# Patient Record
Sex: MALE | Employment: FULL TIME | URBAN - METROPOLITAN AREA
[De-identification: names, ages, dates, MRNs, and addresses within clinical notes are randomized per-mention and may not be internally consistent; named-entity substitution may affect disease eponyms.]

---

## 2018-12-24 ENCOUNTER — HOSPITAL ENCOUNTER (EMERGENCY)
Facility: CLINIC | Age: 43
Discharge: HOME | End: 2018-12-25
Attending: EMERGENCY MEDICINE | Admitting: EMERGENCY MEDICINE
Payer: COMMERCIAL

## 2018-12-24 DIAGNOSIS — R31.0 GROSS HEMATURIA: ICD-10-CM

## 2018-12-24 DIAGNOSIS — N32.89 BLADDER MASS: ICD-10-CM

## 2018-12-24 DIAGNOSIS — N23 RENAL COLIC: ICD-10-CM

## 2018-12-24 LAB
ALBUMIN SERPL-MCNC: 4 G/DL (ref 3.4–5)
ALBUMIN UR-MCNC: 100 MG/DL
ALP SERPL-CCNC: 73 U/L (ref 40–150)
ALT SERPL W P-5'-P-CCNC: 37 U/L (ref 0–70)
ANION GAP SERPL CALCULATED.3IONS-SCNC: 2 MMOL/L (ref 3–14)
APPEARANCE UR: ABNORMAL
AST SERPL W P-5'-P-CCNC: 21 U/L (ref 0–45)
BASOPHILS # BLD AUTO: 0.1 10E9/L (ref 0–0.2)
BASOPHILS NFR BLD AUTO: 0.7 %
BILIRUB SERPL-MCNC: 0.5 MG/DL (ref 0.2–1.3)
BILIRUB UR QL STRIP: NEGATIVE
BUN SERPL-MCNC: 10 MG/DL (ref 7–30)
CALCIUM SERPL-MCNC: 9.4 MG/DL (ref 8.5–10.1)
CHLORIDE SERPL-SCNC: 108 MMOL/L (ref 94–109)
CO2 SERPL-SCNC: 30 MMOL/L (ref 20–32)
COLOR UR AUTO: ABNORMAL
CREAT SERPL-MCNC: 1.08 MG/DL (ref 0.66–1.25)
DIFFERENTIAL METHOD BLD: ABNORMAL
EOSINOPHIL # BLD AUTO: 0.3 10E9/L (ref 0–0.7)
EOSINOPHIL NFR BLD AUTO: 3.4 %
ERYTHROCYTE [DISTWIDTH] IN BLOOD BY AUTOMATED COUNT: 13.2 % (ref 10–15)
GFR SERPL CREATININE-BSD FRML MDRD: 83 ML/MIN/{1.73_M2}
GLUCOSE SERPL-MCNC: 101 MG/DL (ref 70–99)
GLUCOSE UR STRIP-MCNC: NEGATIVE MG/DL
HCT VFR BLD AUTO: 42.2 % (ref 40–53)
HGB BLD-MCNC: 14.2 G/DL (ref 13.3–17.7)
HGB UR QL STRIP: ABNORMAL
IMM GRANULOCYTES # BLD: 0 10E9/L (ref 0–0.4)
IMM GRANULOCYTES NFR BLD: 0.2 %
KETONES UR STRIP-MCNC: NEGATIVE MG/DL
LEUKOCYTE ESTERASE UR QL STRIP: NEGATIVE
LIPASE SERPL-CCNC: 112 U/L (ref 73–393)
LYMPHOCYTES # BLD AUTO: 2.8 10E9/L (ref 0.8–5.3)
LYMPHOCYTES NFR BLD AUTO: 30.3 %
MCH RBC QN AUTO: 29.5 PG (ref 26.5–33)
MCHC RBC AUTO-ENTMCNC: 33.6 G/DL (ref 31.5–36.5)
MCV RBC AUTO: 88 FL (ref 78–100)
MONOCYTES # BLD AUTO: 0.7 10E9/L (ref 0–1.3)
MONOCYTES NFR BLD AUTO: 7.9 %
NEUTROPHILS # BLD AUTO: 5.4 10E9/L (ref 1.6–8.3)
NEUTROPHILS NFR BLD AUTO: 57.5 %
NITRATE UR QL: NEGATIVE
NRBC # BLD AUTO: 0 10*3/UL
NRBC BLD AUTO-RTO: 0 /100
PH UR STRIP: 7 PH (ref 5–7)
PLATELET # BLD AUTO: 148 10E9/L (ref 150–450)
POTASSIUM SERPL-SCNC: 3.7 MMOL/L (ref 3.4–5.3)
PROT SERPL-MCNC: 7.4 G/DL (ref 6.8–8.8)
RBC # BLD AUTO: 4.81 10E12/L (ref 4.4–5.9)
RBC #/AREA URNS AUTO: >182 /HPF (ref 0–2)
SODIUM SERPL-SCNC: 140 MMOL/L (ref 133–144)
SOURCE: ABNORMAL
SP GR UR STRIP: 1.01 (ref 1–1.03)
UROBILINOGEN UR STRIP-MCNC: NEGATIVE MG/DL (ref 0–2)
WBC # BLD AUTO: 9.3 10E9/L (ref 4–11)
WBC #/AREA URNS AUTO: 0 /HPF (ref 0–5)

## 2018-12-24 PROCEDURE — 99285 EMERGENCY DEPT VISIT HI MDM: CPT | Mod: 25

## 2018-12-24 PROCEDURE — 96374 THER/PROPH/DIAG INJ IV PUSH: CPT

## 2018-12-24 PROCEDURE — 76705 ECHO EXAM OF ABDOMEN: CPT

## 2018-12-24 PROCEDURE — 83690 ASSAY OF LIPASE: CPT | Performed by: EMERGENCY MEDICINE

## 2018-12-24 PROCEDURE — 81001 URINALYSIS AUTO W/SCOPE: CPT | Performed by: EMERGENCY MEDICINE

## 2018-12-24 PROCEDURE — 25000128 H RX IP 250 OP 636: Performed by: EMERGENCY MEDICINE

## 2018-12-24 PROCEDURE — 80053 COMPREHEN METABOLIC PANEL: CPT | Performed by: EMERGENCY MEDICINE

## 2018-12-24 PROCEDURE — 96361 HYDRATE IV INFUSION ADD-ON: CPT

## 2018-12-24 PROCEDURE — 85025 COMPLETE CBC W/AUTO DIFF WBC: CPT | Performed by: EMERGENCY MEDICINE

## 2018-12-24 RX ORDER — SODIUM CHLORIDE 9 MG/ML
1000 INJECTION, SOLUTION INTRAVENOUS CONTINUOUS
Status: DISCONTINUED | OUTPATIENT
Start: 2018-12-24 | End: 2018-12-25 | Stop reason: HOSPADM

## 2018-12-24 RX ORDER — KETOROLAC TROMETHAMINE 15 MG/ML
10 INJECTION, SOLUTION INTRAMUSCULAR; INTRAVENOUS ONCE
Status: COMPLETED | OUTPATIENT
Start: 2018-12-24 | End: 2018-12-24

## 2018-12-24 RX ADMIN — SODIUM CHLORIDE 1000 ML: 9 INJECTION, SOLUTION INTRAVENOUS at 22:55

## 2018-12-24 RX ADMIN — KETOROLAC TROMETHAMINE 10 MG: 15 INJECTION, SOLUTION INTRAMUSCULAR; INTRAVENOUS at 22:55

## 2018-12-24 ASSESSMENT — ENCOUNTER SYMPTOMS
CHILLS: 0
FEVER: 0
DYSURIA: 0
NAUSEA: 0
VOMITING: 0
FLANK PAIN: 1
HEMATURIA: 1

## 2018-12-24 ASSESSMENT — MIFFLIN-ST. JEOR: SCORE: 1962.66

## 2018-12-24 NOTE — ED AVS SNAPSHOT
[facility]  [address]  [facility] [address]  Phone:  [phone]  Fax:  [phone]                                    Jose Antonio Ayala   MRN: [id number]    Department:  [facility]   Date of Visit:  12/24/2018           After Visit Summary Signature Page    I have received my discharge instructions, and my questions have been answered. I have discussed any challenges I see with this plan with the nurse or doctor.    ..........................................................................................................................................  Patient/Patient Representative Signature      ..........................................................................................................................................  Patient Representative Print Name and Relationship to Patient    ..................................................               ................................................  Date                                   Time    ..........................................................................................................................................  Reviewed by Signature/Title    ...................................................              ..............................................  Date                                               Time          22EPIC Rev 08/18

## 2018-12-24 NOTE — ED AVS SNAPSHOT
"    [facility] EMERGENCY DEPARTMENT: [phone]                                              INTERAGENCY TRANSFER FORM - LAB / IMAGING / EKG / EMG RESULTS   2018                   Hospital Admission Date: 2018  WILLIAM PERDOMO   : 1975  Sex: Male         Attending Provider:  Bertram Trujillo MD    Allergies:  No Known Allergies    Infection:  None   Service:  EMERGENCY ME    Ht:  1.88 m (6' 2\")   Wt:  99.8 kg (220 lb)   Admission Wt:  99.8 kg (220 lb)    BMI:  28.25 kg/m 2   BSA:  2.28 m 2            Patient PCP Information     Provider PCP Type    Physician No Ref-Primary General         Lab Results - 3 Days      UA with Microscopic [[id number]] (Abnormal)  Resulted: 18, Result status: Final result   Ordering provider:  Bertram Trujillo MD  18 Resulting lab:  [facility]    Specimen Information    Type Source Collected On   Midstream Urine Urine 18          Components    Component Value Reference Range Flag Lab   Color Urine Red     FrRdHs   Appearance Urine Cloudy     FrRdHs   Glucose Urine Negative NEG^Negative mg/dL   FrRdHs   Bilirubin Urine Negative NEG^Negative   FrRdHs   Ketones Urine Negative NEG^Negative mg/dL   FrRdHs   Specific Gravity Urine 1.011 1.003 - 1.035   FrRdHs   Blood Urine Large NEG^Negative A FrRdHs   pH Urine 7.0 5.0 - 7.0 pH   FrRdHs   Protein Albumin Urine 100 NEG^Negative mg/dL A FrRdHs   Urobilinogen mg/dL Negative 0.0 - 2.0 mg/dL   FrRdHs   Nitrite Urine Negative NEG^Negative   FrRdHs   Leukocyte Esterase Urine Negative NEG^Negative   FrRdHs   Source Midstream Urine     FrRdHs   WBC Urine 0 0 - 5 /HPF   FrRdHs   RBC Urine >182 0 - 2 /HPF H FrRdHs            Comprehensive metabolic panel [[id number]] (Abnormal)  Resulted: 18, Result status: Final result   Ordering provider:  Bertram Trujillo MD  18 Resulting lab:  [facility]    Specimen Information    Type Source Collected " On   Blood   12/24/18 2254          Components    Component Value Reference Range Flag Lab   Sodium 140 133 - 144 mmol/L   FrRdHs   Potassium 3.7 3.4 - 5.3 mmol/L   FrRdHs   Chloride 108 94 - 109 mmol/L   FrRdHs   Carbon Dioxide 30 20 - 32 mmol/L   FrRdHs   Anion Gap 2 3 - 14 mmol/L  FrRdHs   Glucose 101 70 - 99 mg/dL H FrRdHs   Urea Nitrogen 10 7 - 30 mg/dL   FrRdHs   Creatinine 1.08 0.66 - 1.25 mg/dL   FrRdHs   GFR Estimate 83 >60 mL/min/{1.73_m2}   FrRdHs   Comment:         Non  GFR Calc  Starting 12/18/2018, serum creatinine based estimated GFR (eGFR) will be   calculated using the Chronic Kidney Disease Epidemiology Collaboration   (CKD-EPI) equation.     GFR Estimate If Black >90 >60 mL/min/{1.73_m2}   FrRdHs   Comment:          GFR Calc  Starting 12/18/2018, serum creatinine based estimated GFR (eGFR) will be   calculated using the Chronic Kidney Disease Epidemiology Collaboration   (CKD-EPI) equation.     Calcium 9.4 8.5 - 10.1 mg/dL   FrRdHs   Bilirubin Total 0.5 0.2 - 1.3 mg/dL   FrRdHs   Albumin 4.0 3.4 - 5.0 g/dL   FrRdHs   Protein Total 7.4 6.8 - 8.8 g/dL   FrRdHs   Alkaline Phosphatase 73 40 - 150 U/L   FrRdHs   ALT 37 0 - 70 U/L   FrRdHs   AST 21 0 - 45 U/L   FrRdHs            Lipase [[id number]]  Resulted: 12/24/18 2327, Result status: Final result   Ordering provider:  Bertram Trujillo MD  12/24/18 2244 Resulting lab:  [facility]    Specimen Information    Type Source Collected On   Blood   12/24/18 2254          Components    Component Value Reference Range Flag Lab   Lipase 112 73 - 393 U/L   FrRdHs            CBC with platelets differential [[id number]] (Abnormal)  Resulted: 12/24/18 2303, Result status: Final result   Ordering provider:  Bertram Trujillo MD  12/24/18 2244 Resulting lab:  [facility]    Specimen Information    Type Source Collected On   Blood   12/24/18 2254          Components    Component Value Reference Range Flag Lab    WBC 9.3 4.0 - 11.0 10e9/L   FrRdHs   RBC Count 4.81 4.4 - 5.9 10e12/L   FrRdHs   Hemoglobin 14.2 13.3 - 17.7 g/dL   FrRdHs   Hematocrit 42.2 40.0 - 53.0 %   FrRdHs   MCV 88 78 - 100 fl   FrRdHs   MCH 29.5 26.5 - 33.0 pg   FrRdHs   MCHC 33.6 31.5 - 36.5 g/dL   FrRdHs   RDW 13.2 10.0 - 15.0 %   FrRdHs   Platelet Count 148 150 - 450 10e9/L  FrRdHs   Diff Method Automated Method     FrRdHs   % Neutrophils 57.5 %   FrRdHs   % Lymphocytes 30.3 %   FrRdHs   % Monocytes 7.9 %   FrRdHs   % Eosinophils 3.4 %   FrRdHs   % Basophils 0.7 %   FrRdHs   % Immature Granulocytes 0.2 %   FrRdHs   Nucleated RBCs 0 0 /100   FrRdHs   Absolute Neutrophil 5.4 1.6 - 8.3 10e9/L   FrRdHs   Absolute Lymphocytes 2.8 0.8 - 5.3 10e9/L   FrRdHs   Absolute Monocytes 0.7 0.0 - 1.3 10e9/L   FrRdHs   Absolute Eosinophils 0.3 0.0 - 0.7 10e9/L   FrRdHs   Absolute Basophils 0.1 0.0 - 0.2 10e9/L   FrRdHs   Abs Immature Granulocytes 0.0 0 - 0.4 10e9/L   FrRdHs   Absolute Nucleated RBC 0.0     FrRdHs            Testing Performed By     Lab - Abbreviation Name Director Address Valid Date Range    12 - [facility] [address] [facility] [address] 05/08/15 1057 - Present            Unresulted Labs (24h ago, onward)    None         Imaging Results - 3 Days      Abd/pelvis CT no contrast - Stone Protocol [[id number]]  Resulted: 12/25/18 0124, Result status: Final result   Ordering provider:  Bertram Trujillo MD  12/25/18 0028 Resulted by:  Ej Nathan MD   Performed:  12/25/18 0035 - 12/25/18 0041 Accession number:  [id number]   Resulting lab:  RADIOLOGY RESULTS   Narrative:  CT ABDOMEN AND PELVIS WITHOUT CONTRAST  12/25/2018 12:41 AM     HISTORY: Flank pain. Possible mass in the bladder on ultrasound.    COMPARISON: None.    TECHNIQUE: Without intravenous or oral contrast, helical sections were  acquired from the top of the diaphragm through the pubic symphysis.  Coronal reconstructions were generated. Radiation  dose for this scan  was reduced using automated exposure control, adjustment of the mA  and/or kV according to the patient's size, or iterative reconstruction  technique. (Renal stone protocol).    FINDINGS:  Right urinary tract: No renal or ureteral calculi. No dilatation of  the intrarenal collecting system or ureter.    Left urinary tract: Tiny 0.2 cm nonobstructing calculus in the upper  pole of the kidney. No visualized ureteral calculi. Mild relative  prominence in caliber of the intrarenal collecting system and ureter.    Urinary bladder: Unremarkable to the limits of a noncontrast CT scan.  No visualized calculi. Mild enlargement of the prostate gland.    Remainder of the abdomen and pelvis: The liver, spleen, pancreas and  adrenal glands are unremarkable to the limits of a noncontrast CT  scan. The gallbladder is present. The small and large bowel are normal  in caliber. A few colonic diverticula are present without evidence of  diverticulitis. The appendix is unremarkable. No bowel wall  thickening, pneumatosis or free intraperitoneal gas. No enlarged lymph  nodes or free fluid in the abdomen or pelvis. Very small left inguinal  hernia containing fat. Tiny paraumbilical hernia containing fat.    Scan through the lower chest is unremarkable.     Impression:  IMPRESSION:  1. Mild relative prominence in caliber of the left intrarenal  collecting system and ureter, without visualized ureteral calculus.  This finding could be physiologic or relate to a recently passed left  ureteral calculus.  2. No other cause of acute pain identified in the abdomen or pelvis.  3. Tiny nonobstructing left renal calculus.  4. The urinary bladder is unremarkable, to the limits of a noncontrast  CT scan.    ALONSO CHILEL MD      POC US ABDOMEN LIMITED [[id number]]  Resulted: 12/24/18 2336, Result status: Final result   Ordering provider:  Bertram Trujillo MD  12/24/18 2336 Performed:  12/24/18 2336 - 12/24/18 2336   Accession  number:  [id number] Resulting lab:  [facility]   Impression:  Limited Bedside Renal Ultrasound, performed and interpreted by me.    Indication: Flank pain  Images of the the right and left kidney were acquired in short and long axis, evaluating for hydronephrosis. A short and long axis of the bladder was evaluated for bladder stones. Image quality was satisfactory..     Findings:  No evidence of hydronephrosis in bilateral kidneys.    No urinary bladder stones seen. Mass or debris noted in bladder        IMPRESSION: No evidence of hydronephrosis or stones. Possible mass vs debris in bladder          Testing Performed By     Lab - Abbreviation Name Director Address Valid Date Range    104 - Rad Rslts RADIOLOGY RESULTS Unknown Unknown 02/16/05 1553 - Present    1735 - [facility] Unknown Unknown 05/12/15 0832 - Present            Encounter-Level Documents:    There are no encounter-level documents.     Order-Level Documents:    There are no order-level documents.

## 2018-12-25 ENCOUNTER — APPOINTMENT (OUTPATIENT)
Dept: CT IMAGING | Facility: CLINIC | Age: 43
End: 2018-12-25
Attending: EMERGENCY MEDICINE
Payer: COMMERCIAL

## 2018-12-25 VITALS
OXYGEN SATURATION: 95 % | BODY MASS INDEX: 28.23 KG/M2 | HEART RATE: 57 BPM | RESPIRATION RATE: 20 BRPM | DIASTOLIC BLOOD PRESSURE: 80 MMHG | TEMPERATURE: 98.1 F | SYSTOLIC BLOOD PRESSURE: 128 MMHG | HEIGHT: 74 IN | WEIGHT: 220 LBS

## 2018-12-25 PROCEDURE — 25000132 ZZH RX MED GY IP 250 OP 250 PS 637: Performed by: EMERGENCY MEDICINE

## 2018-12-25 PROCEDURE — 74176 CT ABD & PELVIS W/O CONTRAST: CPT

## 2018-12-25 RX ORDER — OXYCODONE AND ACETAMINOPHEN 5; 325 MG/1; MG/1
1-2 TABLET ORAL EVERY 4 HOURS PRN
Qty: 12 TABLET | Refills: 0 | Status: SHIPPED | OUTPATIENT
Start: 2018-12-25 | End: 2018-12-28

## 2018-12-25 RX ORDER — HYDROMORPHONE HYDROCHLORIDE 1 MG/ML
0.5 INJECTION, SOLUTION INTRAMUSCULAR; INTRAVENOUS; SUBCUTANEOUS ONCE
Status: DISCONTINUED | OUTPATIENT
Start: 2018-12-25 | End: 2018-12-25 | Stop reason: HOSPADM

## 2018-12-25 RX ORDER — OXYCODONE AND ACETAMINOPHEN 5; 325 MG/1; MG/1
1 TABLET ORAL ONCE
Status: COMPLETED | OUTPATIENT
Start: 2018-12-25 | End: 2018-12-25

## 2018-12-25 RX ADMIN — OXYCODONE HYDROCHLORIDE AND ACETAMINOPHEN 1 TABLET: 5; 325 TABLET ORAL at 02:01

## 2018-12-25 NOTE — ED PROVIDER NOTES
"  History     Chief Complaint:  Hematuria    The history is provided by the patient.      Jose Antonio Ayala is a previously healthy 43 year old male who presents with hematuria. The patient reports that he had urinated 3 times in the past several hours, and each time he has seen bright red blood and particulates in his urine. The patient also reports an onset of sharp left sided flank pain that wrapped around to the front while he was driving about 1 hour ago that has since alleviated. The patient denies dysuria, fever, or nausea. He states that he has never had hematuria or kidney stones in the past.     Allergies:  No known drug allergies      Medications:    Loperamide     Past Medical History:    The patient does not have any past pertinent medical history.     Past Surgical History:    History reviewed. No pertinent surgical history.     Family History:    History reviewed. No pertinent family history.      Social History:  The patient presents to the ED alone    Marital Status:  Single     Review of Systems   Constitutional: Negative for chills and fever.   Gastrointestinal: Negative for nausea and vomiting.   Genitourinary: Positive for flank pain and hematuria. Negative for dysuria.   All other systems reviewed and are negative.      Physical Exam     Patient Vitals for the past 24 hrs:   BP Temp Temp src Pulse Resp SpO2 Height Weight   12/25/18 0115 -- -- -- -- -- 95 % -- --   12/25/18 0110 128/80 -- -- 57 -- -- -- --   12/24/18 2236 137/83 98.1  F (36.7  C) Temporal 69 20 99 % 1.88 m (6' 2\") 99.8 kg (220 lb)        Physical Exam  Constitutional:  Oriented to person, place, and time. Well-appearing  HENT:   Head:    Normocephalic.   Mouth/Throat:   Oropharynx is clear and moist.   Eyes:    EOM are normal. Pupils are equal, round, and reactive to light.   Neck:    Neck supple.   Cardiovascular:  Normal rate, regular rhythm and normal heart sounds.      Exam reveals no gallop and no friction rub.       No " murmur heard.  Pulmonary/Chest:  Effort normal and breath sounds normal.      No respiratory distress. No wheezes. No rales.      No reproducible chest wall pain.  Abdominal:   No pain to percussion to flanks. Soft. No distension. No tenderness. No rebound and no guarding.   Musculoskeletal:  Normal range of motion.   Neurological:   Alert and oriented to person, place, and time.           Moves all 4 extremities spontaneously    Skin:    No rash noted. No pallor.        Emergency Department Course     Procedure:   Bedside US:  ED Limited Bedside Screening Ultrasound performed by Bertram Trujillo MD  Limited Bedside Renal Ultrasound:  Indications:  Flank Pain  ED US Findings:  No evidence of hydronephrosis in bilateral kidneys.  No urinary bladder stones seen. Mass or debris noted in bladder.     Imaging:  Radiographic findings were communicated with the patient who voiced understanding of the findings.    Abd/pelvis CT no contrast- stone protocol  Impression: 1. Mild relative prominence in caliber of the left intrarenal  collecting system and ureter, without visualized ureteral calculus.  This finding could be physiologic or relate to a recently passed left  ureteral calculus.  2. No other cause of acute pain identified in the abdomen or pelvis.  3. Tiny nonobstructing left renal calculus.  4. The urinary bladder is unremarkable, to the limits of a noncontrast  CT scan.  As read by Radiology.     Laboratory:  CBC:  (L), o/w WNL (WBC 9.3, HGB 14.2)     CMP: anion gap 2, glucose 101, creatinine 1.08    Lipase: 112    UA with microscopic: urine blood large, protein albumin urine 100, RBC/HPF >182    Interventions:  2255: NS 1L IV Bolus   2255: Toradol 10 mg, IV  0120: Dilaudid 0.5 mg, IV  0201: Percocet 5-325 mg, PO     Emergency Department Course:  Past medical records, nursing notes, and vitals reviewed.  2241: I performed an exam of the patient and obtained history, as documented above.  IV inserted and blood  drawn.       0009: I rechecked the patient. Patient is pain free.     0029: I performed a bedside ultrasound, see procedure note above.     0150: I discussed patient's case with radiology.     I rechecked the patient. Findings and plan explained to the Patient. Patient discharged home with instructions regarding supportive care, medications, and reasons to return. The importance of close follow-up was reviewed.      Impression & Plan      Medical Decision Making:  Jose Antonio Ayala is a 43 year old male who came in complaining of hematuria and flank pain resolved. Differential includes renal colic, hemorrhagic cystitis, mass, or other causes. Patient had workup as seen. Lab work was otherwise reassuring. Urine pointed towards hematuria without obvious signs of infection. I did perform a bedside ultrasound which did not show obvious signs of hydronephrosis, but did show questionable bladder mass versus blood clot versus debris. Therefore, CT scan was obtained which did not show obvious mass, but did likely pass a stone. After further discussion with radiology, this is felt to be likely a blood clot, although I did discuss the results with the patient and that he will need repeat ultrasound in the near future to see that this is further resolved. Should he have continued hematuria, he should follow up with urology. He is told to return with any worsening pain, or new or worsening symptoms and to follow up with PCP or urology.     Diagnosis:    ICD-10-CM   1. Gross hematuria R31.0   2. Renal colic N23   3. Bladder mass N32.89       Disposition:  discharged to home    Discharge Medications:     Medication List      Started    oxyCODONE-acetaminophen 5-325 MG tablet  Commonly known as:  PERCOCET  1-2 tablets, Oral, EVERY 4 HOURS PRN              Megan Beh  12/24/2018   [facility] EMERGENCY DEPARTMENT  I, Megan Beh, am serving as a scribe at 10:41 PM on 12/24/2018 to document services personally performed by  Bertram Trujillo MD based on my observations and the provider's statements to me.       Bertram Trujillo MD  12/25/18 0445

## 2018-12-25 NOTE — ED TRIAGE NOTES
Patient complaining of blood in urine tonight along with left flank pain that wraps around to the front.      ABCs intact.  Alert and oriented x 3.

## 2018-12-25 NOTE — DISCHARGE INSTRUCTIONS
You have a mass in your bladder. This is likely blood from your kidney stone. Please get a repeat ultrasound through your doctor in the next 2 weeks to see if this is resolved       Discharge Instructions  Kidney Stones    Kidney stones are a common problem that can cause a lot of pain but fortunately are usually not dangerous and can be generally treated with medicine at home.  However, sometimes your condition may be worse than it seemed at first, or may get worse with time.     You need to follow-up with your regular doctor within 3 days.    Most kidney stones will pass on their own, but occasionally stones may need to be removed by an urologist. We will send you home with a urine strainer. Be sure to urinate into this, or urinate into a container and pour the urine through the fine filter to catch the kidney stone as it comes out. The stone will seem like a pebble or grain of sand. Be sure to save this in a Ziploc  bag and take it to the doctor?s office with you.       Return to the Emergency Department if:  Your pain is not controlled.  You are vomiting and can?t keep fluids or medications down.  You develop fever (>101).  You feel much more ill or develop new symptoms.  What can I do to help myself?  Be sure to drink plenty of fluids.  Staying active is good, and may help the stone to pass. You may do whatever you feel up to doing without restrictions.   Treatment:  Non-steroidal anti-inflammatory drugs (NSAIDs). This includes prescription medicines like Toradol  (ketorolac) and non-prescription medicines like Advil  (ibuprofen) and Nuprin  (ibuprofen). These pain relievers are very effective for kidney stones.  Narcotic pain pills. If you have been given a narcotic such as Vicodin  (hydrocodone with acetaminophen), Percocet  (oxycodone with acetaminophen), or codeine, do not drive for four hours after you have taken it. If the narcotic contains Tylenol  (acetaminophen), do not take Tylenol  with it. All  narcotics will cause constipation, so eat a high fiber diet.    Nausea medication.  Nausea and vomiting are common with kidney stones, so your physician may send you home with medicine for this.   Flomax  (tamsulosin). This medicine is sometimes used for men with prostate problems, but also can help kidney stones to pass. This medicine can lower blood pressure, and you may feel faint, especially when you first stand up. Be sure to get up gradually, sit down if you feel faint, and avoid activity where feeling faint would be dangerous, such as climbing ladders.   If you were given a prescription for medicine here today, be sure to read all of the information (including the package insert) that comes with your prescription.  This will include important information about the medicine, its side effects, and any warnings that you need to know about.  The pharmacist who fills the prescription can provide more information and answer questions you may have about the medicine.  If you have questions or concerns that the pharmacist cannot address, please call or return to the Emergency Department.   Opioid Medication Information    Pain medications are among the most commonly prescribed medicines, so we are including this information for all our patients. If you did not receive pain medication or get a prescription for pain medicine, you can ignore it.     You may have been given a prescription for an opioid (narcotic) pain medicine and/or have received a pain medicine while here in the Emergency Department. These medicines can make you drowsy or impaired. You must not drive, operate dangerous equipment, or engage in any other dangerous activities while taking these medications. If you drive while taking these medications, you could be arrested for DUI, or driving under the influence. Do not drink any alcohol while you are taking these medications.     Opioid pain medications can cause addiction. If you have a history of  chemical dependency of any type, you are at a higher risk of becoming addicted to pain medications.  Only take these prescribed medications to treat your pain when all other options have been tried. Take it for as short a time and as few doses as possible. Store your pain pills in a secure place, as they are frequently stolen and provide a dangerous opportunity for children or visitors in your house to start abusing these powerful medications. We will not replace any lost or stolen medicine.  As soon as your pain is better, you should flush all your remaining medication.     Many prescription pain medications contain Tylenol  (acetaminophen), including Vicodin , Tylenol #3 , Norco , Lortab , and Percocet .  You should not take any extra pills of Tylenol  if you are using these prescription medications or you can get very sick.  Do not ever take more than 3000 mg of acetaminophen in any 24 hour period.    All opioids tend to cause constipation. Drink plenty of water and eat foods that have a lot of fiber, such as fruits, vegetables, prune juice, apple juice and high fiber cereal.  Take a laxative if you don?t move your bowels at least every other day. Miralax , Milk of Magnesia, Colace , or Senna  can be used to keep you regular.      Remember that you can always come back to the Emergency Department if you are not able to see your regular doctor in the amount of time listed above, if you get any new symptoms, or if there is anything that worries you.

## 2020-03-27 ENCOUNTER — VIRTUAL VISIT (OUTPATIENT)
Dept: FAMILY MEDICINE | Facility: OTHER | Age: 45
End: 2020-03-27

## 2020-03-27 NOTE — PROGRESS NOTES
"Date: 2020 08:06:04  Clinician: Catie Mcdonald  Clinician NPI: [id number]  Patient: Jose Antonio Ayala  Patient : 1975  Patient Address: [address] [place] [address]  Patient Phone: [phone]  Visit Protocol: URI  Patient Summary:  Jose Antonio is a 44 year old ( : 1975 ) male who initiated a Visit for COVID-19 (Coronavirus) evaluation and screening. When asked the question \"Please sign me up to receive news, health information and promotions. \", Jose Antonio responded \"Yes\".    Jose Antonio states his symptoms started gradually 3-6 days ago. After his symptoms started, they improved and then got worse again.   His symptoms consist of rhinitis, a cough, and malaise.   Symptom details     Nasal secretions: The color of his mucus is yellow.    Cough: Jose Antonio coughs every 5-10 minutes and his cough is not more bothersome at night. Phlegm comes into his throat when he coughs. He does not believe his cough is caused by post-nasal drip. The color of the phlegm is yellow and white.      Jose Antonio denies having ear pain, wheezing, sore throat, nasal congestion, enlarged lymph nodes, teeth pain, headache, fever, facial pain or pressure, myalgias, and chills. He also denies taking antibiotic medication for the symptoms, having recent facial or sinus surgery in the past 60 days, and having a sinus infection within the past year. He is not experiencing dyspnea.   Precipitating events  He has not recently been exposed to someone with influenza. Jose Antonio has not been in close contact with any high risk individuals.   Pertinent COVID-19 (Coronavirus) information  Jose Antonio has not traveled internationally or to the areas where COVID-19 (Coronavirus) is widespread, including cruise ship travel in the last 14 days before the start of his symptoms.   Jose Antonio has not had a close contact with a laboratory-confirmed COVID-19 patient within 14 days of symptom onset. He also has not had a close contact with a suspected COVID-19 " patient within 14 days of symptom onset.   Jose Antonio is not a healthcare worker or a  and does not work in a healthcare facility. He does not live with a healthcare worker.   Pertinent medical history  Jose Antonio does not need a return to work/school note.   Weight: 220 lbs   Jose Antonio does not smoke or use smokeless tobacco.   Weight: 220 lbs    MEDICATIONS: No current medications, ALLERGIES: NKDA  Clinician Response:  Dear Jose Antonio,   Based on the information you have provided, you do have symptoms that are consistent with Coronavirus (COVID-19).   The coronavirus causes mild to severe respiratory illness with the most common symptoms including fever, cough and difficulty breathing. Unfortunately, many viruses cause similar symptoms and it can be difficult to distinguish between viruses, especially in mild cases, so we are presuming that anyone with cough or fever has coronavirus at this time.  Coronavirus/COVID-19 has reached the point of community spread in [place], meaning that we are finding the virus in people with no known exposure risk for bell the virus. Given the increasing commonness of coronavirus in the community we are no longer testing patients who are not critically ill.  If you are a health care worker, you should refer to your employee health office for instructions about testing and returning to work.  For everyone else who has cough or fever, you should assume you are infected with coronavirus. Since you will not be tested but have symptoms that may be consistent with coronavirus, the CDC recommends you stay in self-isolation until these three things have happened:    You have had no fever for at least 72 hours (that is three full days of no fever without the use of medicine that reduces fevers)    AND   Other symptoms have improved (for example, when your cough or shortness of breath have improved)   AND   At least 7 days have passed since your symptoms first appeared.   How to Isolate:     Isolate yourself at home.   Do Not allow any visitors  Do Not go to work or school  Do Not go to [employer],  centers, shopping, or other public places.  Do Not shake hands.  Avoid close contact with others (hugging, kissing).   Protect Others:    Cover Your Mouth and Nose with a mask, disposable tissue or wash cloth to avoid spreading germs to others.  Wash your hands and face frequently with soap and water.   Managing Symptoms:    At this time, we primarily recommend Tylenol (Acetaminophen) for fever or pain. If you have liver or kidney problems, contact your primary care provider for instructions on use of tylenol. Adults can take 650 mg (two 325 mg pills) by mouth every 4-6 hours as needed OR 1,000 mg (two 500 mg pills) every 8 hours as needed. MAXIMUM DAILY DOSE: 3,000mg. For children, refer to dosing on bottle based on age or weight.   If you develop significant shortness of breath that prevents you from doing normal activities, please call 911 or proceed to the nearest emergency room and alert them immediately that you have been in self-isolation for possible coronavirus.   If you have a higher risk medical condition such as cancer, heart failure, end stage renal disease on dialysis or have a transplant, please reach out to your specialist's clinic to advise them of your [facility] visit should you not improve within the next two days.  For more information about COVID19 and options for caring for yourself at home, please visit the CDC website at https://www.cdc.gov/coronavirus/2019-ncov/about/steps-when-sick.htmlFor more options for care at [facility], please visit our website at [email or web address]     Diagnosis: Cough  Diagnosis ICD: R05

## 2020-08-07 ENCOUNTER — HOSPITAL ENCOUNTER (EMERGENCY)
Facility: CLINIC | Age: 45
Discharge: HOME | End: 2020-08-07
Attending: EMERGENCY MEDICINE | Admitting: EMERGENCY MEDICINE

## 2020-08-07 VITALS
DIASTOLIC BLOOD PRESSURE: 98 MMHG | HEART RATE: 74 BPM | SYSTOLIC BLOOD PRESSURE: 141 MMHG | OXYGEN SATURATION: 97 % | RESPIRATION RATE: 16 BRPM | TEMPERATURE: 98.6 F

## 2020-08-07 DIAGNOSIS — L24.5 IRRITANT CONTACT DERMATITIS DUE TO CHEMICAL: ICD-10-CM

## 2020-08-07 PROCEDURE — 99282 EMERGENCY DEPT VISIT SF MDM: CPT

## 2020-08-07 RX ORDER — DIAPER,BRIEF,INFANT-TODD,DISP
EACH MISCELLANEOUS
Qty: 25 G | Refills: 0 | Status: SHIPPED | OUTPATIENT
Start: 2020-08-07

## 2020-08-07 NOTE — ED PROVIDER NOTES
History     Chief Complaint:  Rash     HPI   Jose Antonio Ayala is a 45 year old male who presents with a rash. The patient used [employer] beard coloration last night and developed a itchy rash to the distribution of his facial hair. He states that this is the second time he has used this product and developed a mild rash the first time. However, this time is worse and therefore he decided to come in for evaluation.     Allergies:  No Known Drug Allergies    Medications:    Medications reviewed. No current medications.     Past Medical History:    Medical history reviewed. No pertinent medical history.    Past Surgical History:    Surgical history reviewed. No pertinent surgical history.    Family History:    Family history reviewed. No pertinent family history.      Social History:  Smoking Status: former  Smokeless Tobacco: Never Used  Alcohol Use: No  Drug Use: No    Review of Systems   Skin: Positive for rash (facial hair area).   All other systems reviewed and are negative.    Physical Exam     Patient Vitals for the past 24 hrs:   BP Temp Pulse Resp SpO2   08/07/20 0106 141/98 98.6  F (37  C) 74 16 97 %      Nursing note and vitals reviewed.  Constitutional: Cooperative.   HENT:   Mouth/Throat: Mucous membranes are normal. No oropharyngeal edema.   Pulmonary/Chest: Effort normal  Neurological: Alert. Oriented x4  Skin: Skin is warm and dry. Erythema and facial swelling in the exact distribution of his facial hair which he shaved off today.   Psychiatric: Normal mood and affect.      Emergency Department Course     Emergency Department Course:  Past medical records, nursing notes, and vitals reviewed.    0126 I performed an exam of the patient as documented above.      Findings and plan explained to the Patient. Patient discharged home with instructions regarding supportive care, medications, and reasons to return. The importance of close follow-up was reviewed. The patient was prescribed Cortaid.      Impression &  Plan     Medical Decision Making:  Jose Antonio Ayala is a 45 year old male who presents for evaluation of a rash in the distribution of his facial hair.  This appears consistent with a contact dermatitis.  A broad differential was considered including infection associated with rash,cellulitis, atopic dermatitis, allergic dermatitis, contact dermatitis, chemical dermatitis, lice, scabies, environmental, etc.  I am favoring chemical contact dermatitis at this time given time frame and locations on the body.  Outpatient regimen as noted above.  See primary this week for recheck.     Discharge Diagnosis:    ICD-10-CM    1. Irritant contact dermatitis due to chemical  L24.5        Disposition:  The patient is discharged to home.    Discharge Medications:  New Prescriptions    HYDROCORTISONE (CORTAID) 1 % EXTERNAL OINTMENT    Apply to facial rash q12 hours x 2 days.       Scribe Disclosure:  Bello CARMONA, am serving as a scribe at 1:26 AM on 8/7/2020 to document services personally performed by Darian Younger MD based on my observations and the provider's statements to me.      8/7/2020   Darian Younger MD Amdahl, John, MD  08/07/20 0142

## 2020-08-07 NOTE — ED TRIAGE NOTES
Pt in with C/O rash to the face. Pt reports last night he used just for med to color his beard and when he woke up this morning he was itchy and worsened throughout the day. Pt has what appears to be a contact dermatitis the the face. Pt A&Ox4 ABCD's intact

## 2020-08-07 NOTE — ED AVS SNAPSHOT
[facility] Emergency Department  [address]  [facility] [address]  Phone:  [phone]  Fax:  [phone]                                    Jose Antonio Ayala   MRN: [id number]    Department:  [facility] Emergency Department   Date of Visit:  8/7/2020           After Visit Summary Signature Page    I have received my discharge instructions, and my questions have been answered. I have discussed any challenges I see with this plan with the nurse or doctor.    ..........................................................................................................................................  Patient/Patient Representative Signature      ..........................................................................................................................................  Patient Representative Print Name and Relationship to Patient    ..................................................               ................................................  Date                                   Time    ..........................................................................................................................................  Reviewed by Signature/Title    ...................................................              ..............................................  Date                                               Time          22EPIC Rev 08/18

## 2020-12-04 ENCOUNTER — NURSE TRIAGE (OUTPATIENT)
Dept: NURSING | Facility: CLINIC | Age: 45
End: 2020-12-04

## 2020-12-05 NOTE — TELEPHONE ENCOUNTER
Caller has probable Covid; family positive but his test was negative 4 days ago; today developed fever 101.4 . Very worried and wonders what to do and watch for. Has no PCP, contacting [facility] through hotline.  Triage protocol reviewed   Is not high risk  Advised in home care and isolation  Advised to call back with worsening symptoms or seek  treatment in ED or UC  Caller understands and will comply   Kelly Torres RN  FNA       Reason for Disposition    [1] COVID-19 infection suspected by caller or triager AND [2] mild symptoms (cough, fever, or others) AND [3] no complications or SOB    Additional Information    Negative: SEVERE difficulty breathing (e.g., struggling for each breath, speaks in single words)    Negative: Difficult to awaken or acting confused (e.g., disoriented, slurred speech)    Negative: Bluish (or gray) lips or face now    Negative: Shock suspected (e.g., cold/pale/clammy skin, too weak to stand, low BP, rapid pulse)    Negative: Sounds like a life-threatening emergency to the triager    Negative: [1] COVID-19 exposure AND [2] no symptoms    Negative: COVID-19 and Breastfeeding, questions about    Negative: [1] Adult with possible COVID-19 symptoms AND [2] triager concerned about severity of symptoms or other causes    Negative: SEVERE or constant chest pain or pressure (Exception: mild central chest pain, present only when coughing)    Negative: MODERATE difficulty breathing (e.g., speaks in phrases, SOB even at rest, pulse 100-120)    Negative: Patient sounds very sick or weak to the triager    Negative: MILD difficulty breathing (e.g., minimal/no SOB at rest, SOB with walking, pulse <100)    Negative: Chest pain or pressure    Negative: Fever > 103 F (39.4 C)    Negative: [1] Fever > 101 F (38.3 C) AND [2] age > 60    Negative: [1] Fever > 100.0 F (37.8 C) AND [2] bedridden (e.g., nursing home patient, CVA, chronic illness, recovering from surgery)    Negative: HIGH RISK patient (e.g., age >  64 years, diabetes, heart or lung disease, weak immune system) (Exception: Has already been evaluated by healthcare provider and has no new or worsening symptoms)    Negative: Fever present > 3 days (72 hours)    Negative: [1] Fever returns after gone for over 24 hours AND [2] symptoms worse or not improved    Negative: [1] Continuous (nonstop) coughing interferes with work or school AND [2] no improvement using cough treatment per protocol    Protocols used: CORONAVIRUS (COVID-19) DIAGNOSED OR [id number]-I-DS 8.4.20

## 2020-12-12 ENCOUNTER — NURSE TRIAGE (OUTPATIENT)
Dept: NURSING | Facility: CLINIC | Age: 45
End: 2020-12-12

## 2020-12-12 ENCOUNTER — VIRTUAL VISIT (OUTPATIENT)
Dept: URGENT CARE | Facility: CLINIC | Age: 45
End: 2020-12-12
Payer: COMMERCIAL

## 2020-12-12 DIAGNOSIS — U07.1 CLINICAL DIAGNOSIS OF COVID-19: Primary | ICD-10-CM

## 2020-12-12 PROCEDURE — 99442 PR PHYSICIAN TELEPHONE EVALUATION 11-20 MIN: CPT

## 2020-12-12 NOTE — TELEPHONE ENCOUNTER
Positive Covid. Has had eight days fever, body ache, headache, some nausea. 100-103. Tylenol helping.     Reports that breathing is okay, slight shortness of breath moving about the house but able to walk up some stairs. Shares that he is scheduled to return to work on Tuesday (in three days) and is still feeling so poorly. Transferred to get virtual follow up visit made.

## 2020-12-12 NOTE — PATIENT INSTRUCTIONS
December 12, 2020      Jose Antonio Ayala  [address] [facility] [address]        To Whom It May Concern:    Jose Antonio Ayala was evaluated by our clinic. He may return to work without restrictions on 12/18/2020 if his symptoms of cough, fever, and body aches have resolved.      Sincerely,    Juliette Rosa MD

## 2020-12-12 NOTE — LETTER
December 12, 2020      Jose Antonio Ayala  [address] [facility] [facility] [address]        To Whom It May Concern:    Jose Antonio Ayala was evaluated by our clinic. He may return to work without restrictions on 12/18/2020 if his symptoms of cough, fever, and body aches have resolved.      Sincerely,    MD Juliette Prince MD

## 2020-12-12 NOTE — PROGRESS NOTES
"  Jose Antonio Ayala is a 45 year old male who is being evaluated via a billable telephone visit (changed from video to telephone to reach pt earlier than scheduled time due to sxs.)     The patient has been notified of following:     \"This telephone visit will be conducted via a phone call between you and your physician/provider. We have found that certain health care needs can be provided without the need for a physical exam.  This service lets us provide the care you need with a phone conversation.  If a prescription is necessary we can send it directly to your pharmacy.  If lab work is needed we can place an order for that and you can then stop by our lab to have the test done at a later time.\"     Patient has given verbal consent for telephone visit?  Yes    SUBJECTIVE:  Jose Antonio Ayala is an 45 year old male who presents for covid.  Has had fevers and body aches and headaches for 8 days straight and isn't improving.  He had a positive test after his wife tested positive and then he started getting fevers and body aches.  Fevers up to 100 -102+.  Tylenol and ibuprofen help a little.  Body aches and fevers return as med wears off.  Some cough, sometimes productive.  Minimal runny nose.  No sore throat.  Some nausea.  No vomiting.  Some diarrhea last night.  Is able to eat some but has decreased appetite so eating less.  Drinking fluids okay.  Breathing is occasionally a little shortness of breath but otherwise okay.  Sometimes with a deep breath will get a little bit of pain. No shortness of breath if walking around house.  Some headaches.  No lightheadedness or dizziness.  Normal urination.      PMH:  neg    Social History     Socioeconomic History     Marital status:      Spouse name: Not on file     Number of children: Not on file     Years of education: Not on file     Highest education level: Not on file   Occupational History     Not on file   Social Needs     Financial resource strain: Not on file     Food " insecurity     Worry: Not on file     Inability: Not on file     Transportation needs     Medical: Not on file     Non-medical: Not on file   Tobacco Use     Smoking status: Not on file   Substance and Sexual Activity     Alcohol use: Not on file     Drug use: Not on file     Sexual activity: Not on file   Lifestyle     Physical activity     Days per week: Not on file     Minutes per session: Not on file     Stress: Not on file   Relationships     Social connections     Talks on phone: Not on file     Gets together: Not on file     Attends [employer] service: Not on file     Active member of club or organization: Not on file     Attends meetings of clubs or organizations: Not on file     Relationship status: Not on file     Intimate partner violence     Fear of current or ex partner: Not on file     Emotionally abused: Not on file     Physically abused: Not on file     Forced sexual activity: Not on file   Other Topics Concern     Not on file   Social History Narrative     Not on file     No family history on file.    ALLERGIES:  Patient has no known allergies.    Current Outpatient Medications   Medication     hydrocortisone (CORTAID) 1 % external ointment     No current facility-administered medications for this visit.          ROS:  ROS is done and is negative for general/constitutional, eye, ENT, Respiratory, cardiovascular, GI, , Skin, musculoskeletal except as noted elsewhere.  All other review of systems negative except as noted elsewhere.      OBJECTIVE:  There were no vitals taken for this visit.  GENERAL : Alert and oriented.  Did not seem to be in distress.   RESP: No respiratory distress detected during phone conversation           ASSESSMENT/PLAN:    ASSESSMENT / PLAN:  (U07.1) Clinical diagnosis of COVID-19  (primary encounter diagnosis)  Comment: pt has dx of covid both based on sxs and positive test.  He continues to have ongoing sxs.  Currently no significant shortness of breath or breathing  problems, but as has ongoing sxs, will need to continue to stay out of work.  Plan: work note done.  Reviewed quarantine I reviewed supportive care, otc meds to use if needed, expected course, and signs of concern.  Follow up as needed or if he does not improve some within 4 day(s) so can be evaluated in person, or sooner if worsens in any way.  Reviewed red flag symptoms and is to go to the ER if experiences any of these.          See [facility] for orders, medications, letters, patient instructions    Juliette Rosa MD  12/12/2020, 9:19 AM      Phone call duration:  13 minutes

## 2020-12-14 ENCOUNTER — NURSE TRIAGE (OUTPATIENT)
Dept: NURSING | Facility: CLINIC | Age: 45
End: 2020-12-14

## 2020-12-14 ENCOUNTER — HEALTH MAINTENANCE LETTER (OUTPATIENT)
Age: 45
End: 2020-12-14

## 2020-12-14 NOTE — TELEPHONE ENCOUNTER
"Triage Call    Pt says he is + covid and has had a fever and body aches x 10days.   Says he is not improving.  Does not know his current body temperature.   Denies breathing difficulties. No cough.    Is supposed to go to work tomorrow but needs a note. The Doctor from his 12/12/2020 virtual visit wrote a work note, but pt says he is not able to get into his My chart account to see it.  Says he spent  1 1/2 hrs yesterday working with \"[employer] people\" but not able to figure out how to use it.  Says he does not have a primary doctor.  Does not want another virtual visit.  Wants me to find his note and get it to him somehow.   Informed pt that this nurse line cannot access his 6APThart.    Will send note to Dr Juliette Rosa to call patient.    Jennifer Armenta RN    "

## 2020-12-14 NOTE — LETTER
December 15, 2020      Jose Antonio Ayala  [address] [facility] [address]        To Whom It May Concern:    Jose Antonio Ayala was evaluated by our clinic. He will miss work through at least 12/15/2020, but may need to miss additional days if his symptoms have nor resolved.  When he is free of fever, cough, and other symptoms, he may return to work without restrictions.      Sincerely,        Jennifer Armenta RN

## 2021-10-02 ENCOUNTER — HEALTH MAINTENANCE LETTER (OUTPATIENT)
Age: 46
End: 2021-10-02

## 2022-01-22 ENCOUNTER — HEALTH MAINTENANCE LETTER (OUTPATIENT)
Age: 47
End: 2022-01-22

## 2023-01-14 ENCOUNTER — HEALTH MAINTENANCE LETTER (OUTPATIENT)
Age: 48
End: 2023-01-14

## 2023-04-23 ENCOUNTER — HEALTH MAINTENANCE LETTER (OUTPATIENT)
Age: 48
End: 2023-04-23